# Patient Record
Sex: MALE | Race: OTHER | HISPANIC OR LATINO | URBAN - METROPOLITAN AREA
[De-identification: names, ages, dates, MRNs, and addresses within clinical notes are randomized per-mention and may not be internally consistent; named-entity substitution may affect disease eponyms.]

---

## 2018-05-18 ENCOUNTER — EMERGENCY (EMERGENCY)
Facility: HOSPITAL | Age: 53
LOS: 1 days | Discharge: HOME | End: 2018-05-18
Admitting: EMERGENCY MEDICINE
Payer: SELF-PAY

## 2018-05-18 VITALS
OXYGEN SATURATION: 98 % | WEIGHT: 179.9 LBS | HEART RATE: 71 BPM | DIASTOLIC BLOOD PRESSURE: 73 MMHG | RESPIRATION RATE: 18 BRPM | SYSTOLIC BLOOD PRESSURE: 127 MMHG | TEMPERATURE: 98 F
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
## 2018-05-18 DIAGNOSIS — S93.402A SPRAIN OF UNSPECIFIED LIGAMENT OF LEFT ANKLE, INITIAL ENCOUNTER: ICD-10-CM

## 2018-05-18 DIAGNOSIS — M25.572 PAIN IN LEFT ANKLE AND JOINTS OF LEFT FOOT: ICD-10-CM

## 2018-05-18 PROCEDURE — 73610 X-RAY EXAM OF ANKLE: CPT

## 2018-05-18 PROCEDURE — 99283 EMERGENCY DEPT VISIT LOW MDM: CPT | Mod: 25

## 2018-05-18 PROCEDURE — 73610 X-RAY EXAM OF ANKLE: CPT | Mod: 26,LT

## 2018-05-18 PROCEDURE — 73610 X-RAY EXAM OF ANKLE: CPT | Mod: 26

## 2018-05-18 NOTE — ED ADULT NURSE NOTE - OBJECTIVE STATEMENT
Pt presents c/o 4/10 left ankle pain and swelling s/p slip and fall at work x3 hours ago.  Pt ambulates in w/ limping gait.  Pt elicits icing and taking 600mg PO ibuprofen immediately after.  Pt denies LOC, CP, SOB, dizziness or color change.  Pt CMS intact on exam.  Pt pending XR.

## 2018-05-18 NOTE — ED PROVIDER NOTE - OBJECTIVE STATEMENT
53 yo m without sig pmhx c/o left ankle injury.  Was at work at [employer] this evening around 6:45 pm when he slipped on wet floor, rolling the left ankle and landing on buttocks.  Denies any pain other than left ankle, did not hit head.  He has been able to walk with a limp, and took a taxi to the ER.  He took 3 Advil before coming to ER.  No weakness or paresthesia in foot or leg.    PMHx none  PSHx skull fx age 4  Meds none  NKA  Soc no tobacco

## 2018-05-18 NOTE — ED PROVIDER NOTE - MEDICAL DECISION MAKING DETAILS
Isolated ankle sprain.  NVI.  No s/s of other injuries.  Tx: PRICE protocol, crutches.  Ortho follow up.

## 2025-04-23 NOTE — ED PROVIDER NOTE - PHYSICAL EXAMINATION
Before Surgery/Procedure Hold (Do Not Take) these Medications  Medications and Supplements:  - Morning of surgery hold any Vitamins AND for 2 weeks prior hold any Vitamin E or Herbals     Anticoagulation:    - Pt states he never started ASA, will discuss at upcoming appt with Cards (aspirin (ECOTRIN) 81 MG EC tablet [[id number]])    Antidiabetic:    - Morning of surgery hold (metFORMIN (GLUCOPHAGE-XR) 500 MG 24 hr tablet [[id number]])    VERENA Risk (Diuretics, ACE-I/ARB, NSAIDs):    - 24 hours before surgery hold (lisinopril (ZESTRIL) 40 MG tablet [[id number]])  - 24 hours before surgery hold (spironolactone (ALDACTONE) 25 MG tablet [[id number]]) and if taking potassium    Continue all other medications unless an alternative plan was advised with the surgeon and/or specialist.   GEN: WD/WN, NAD  HEAD: NC/AT; no periorbital ecchymosis or Garrett's sign  NEURO: Alert, oriented. CN II-XII grossly intact. Clear speech.  SILT all ext. Motor 5/5 all ext. Gait mild limp.  EYE: PERRL, EOMI.   ENT: Airway patent.  No dental injury. No epistaxis or rhinorrhea. No otorrhea or hemotympanum.  PULM: No resp distress. Lungs CTA bilat.  CV: RRR, S1S2  GI: Abdomen soft, nontender  MSK: Neck with painless ROM; no midline neck tenderness.  LLE: no pain in hip/knee with ROM.  No prox calf tenderness.  Ankle mild tenderness over region of ATFL and PTFL.  Mild TTP over lat malleolus.  No tenderness in foot.  Strong DP and PT pulses. Toes pink, warm, brisk cap refill.  All muscle compartments soft.    SKIN: Intact.  Normal color and turgor.